# Patient Record
Sex: FEMALE | Race: WHITE | NOT HISPANIC OR LATINO | Employment: OTHER | URBAN - METROPOLITAN AREA
[De-identification: names, ages, dates, MRNs, and addresses within clinical notes are randomized per-mention and may not be internally consistent; named-entity substitution may affect disease eponyms.]

---

## 2024-02-14 ENCOUNTER — HOSPITAL ENCOUNTER (OUTPATIENT)
Age: 74
End: 2024-02-14
Attending: ORTHOPAEDIC SURGERY | Admitting: ORTHOPAEDIC SURGERY

## 2024-03-06 ENCOUNTER — HOSPITAL ENCOUNTER (EMERGENCY)
Facility: HOSPITAL | Age: 74
Discharge: LEFT BEFORE DISCHARGE | End: 2024-03-06
Payer: MEDICARE

## 2024-03-06 VITALS
HEART RATE: 97 BPM | TEMPERATURE: 97 F | SYSTOLIC BLOOD PRESSURE: 174 MMHG | HEIGHT: 60 IN | RESPIRATION RATE: 18 BRPM | OXYGEN SATURATION: 97 % | BODY MASS INDEX: 29.45 KG/M2 | WEIGHT: 150 LBS | DIASTOLIC BLOOD PRESSURE: 97 MMHG

## 2024-03-06 PROCEDURE — 93005 ELECTROCARDIOGRAM TRACING: CPT

## 2024-03-06 RX ORDER — MONTELUKAST SODIUM 4 MG/1
4 TABLET, CHEWABLE ORAL NIGHTLY
COMMUNITY

## 2024-03-06 RX ORDER — EZETIMIBE 10 MG/1
TABLET ORAL NIGHTLY
COMMUNITY

## 2024-03-06 RX ORDER — ZOLPIDEM TARTRATE 10 MG/1
10 TABLET ORAL NIGHTLY PRN
COMMUNITY

## 2024-03-06 RX ORDER — DULOXETIN HYDROCHLORIDE 30 MG/1
30 CAPSULE, DELAYED RELEASE ORAL DAILY
COMMUNITY

## 2024-03-06 RX ORDER — AMLODIPINE BESYLATE 5 MG/1
5 TABLET ORAL DAILY
COMMUNITY

## 2024-03-06 RX ORDER — PANTOPRAZOLE SODIUM 40 MG/1
40 TABLET, DELAYED RELEASE ORAL
COMMUNITY

## 2024-03-06 NOTE — ED INITIAL ASSESSMENT (HPI)
Pt to ED for worsening anxiety and insomnia for the last several days. States PCP recently prescribed duloxetine and zolpidem with no relief.

## 2024-03-07 LAB
ATRIAL RATE: 89 BPM
P AXIS: 58 DEGREES
P-R INTERVAL: 138 MS
Q-T INTERVAL: 344 MS
QRS DURATION: 78 MS
QTC CALCULATION (BEZET): 418 MS
R AXIS: 18 DEGREES
T AXIS: 39 DEGREES
VENTRICULAR RATE: 89 BPM

## 2024-05-13 ENCOUNTER — EXTERNAL LAB (OUTPATIENT)
Dept: HEALTH INFORMATION MANAGEMENT | Facility: OTHER | Age: 74
End: 2024-05-13

## 2024-05-13 LAB
ALBUMIN SERPL-MCNC: 4.2 G/DL (ref 3.5–5.7)
ALP SERPL-CCNC: 74 U/L (ref 34–104)
ALT SERPL-CCNC: 18 U/L (ref 7–52)
ANION GAP SERPL CALC-SCNC: 12.5 MEQ/L (ref 6.2–14.7)
APPEARANCE UR: CLEAR
APTT PPP: 33.4 SEC (ref 25–36)
AST SERPL-CCNC: 16 U/L (ref 13–39)
BACTERIA #/AREA URNS HPF: ABNORMAL /[HPF]
BASOPHILS # BLD: 0 10*3/UL (ref 0–0.2)
BASOPHILS NFR BLD: 0.3 % (ref 0–2)
BILIRUB SERPL-MCNC: 0.4 MG/DL (ref 0.3–1)
BILIRUB UR QL: NEGATIVE
BUN SERPL-MCNC: 17 MG/DL (ref 7–25)
CALCIUM SERPL-MCNC: 9.5 MG/DL (ref 8.6–10.3)
CHLORIDE SERPL-SCNC: 106 MEQ/L (ref 98–107)
CO2 SERPL-SCNC: 26.5 MEQ/L (ref 21–31)
COLOR UR: YELLOW
CREAT SERPL-MCNC: 0.6 MG/DL (ref 0.6–1.2)
EOSINOPHIL # BLD: 0.1 10*3/UL (ref 0–0.5)
EOSINOPHIL NFR BLD: 1.7 % (ref 0.1–6.8)
EPI CELLS #/AREA URNS LPF: ABNORMAL /[LPF]
ERYTHROCYTE [DISTWIDTH] IN BLOOD: 13.6 % (ref 11.9–15.9)
GFR SERPLBLD SCHWARTZ-ARVRAT: 95 ML/MIN/{1.73_M2}
GLUCOSE SERPL-MCNC: 89 MG/DL (ref 70–99)
GLUCOSE UR-MCNC: NEGATIVE MG/DL
HCT VFR BLD CALC: 41.1 % (ref 34.7–45.1)
HGB BLD-MCNC: 13.9 G/DL (ref 12–15.3)
HGB UR QL: NEGATIVE
INR PPP: 1
KETONES UR-MCNC: NEGATIVE MG/DL
LENGTH OF FAST TIME PATIENT: ABNORMAL H
LEUKOCYTE ESTERASE UR QL STRIP: ABNORMAL
LYMPHOCYTES # BLD: 2.1 10*3/UL (ref 0.6–3.4)
LYMPHOCYTES NFR BLD: 37.1 % (ref 14.5–45.2)
MCH RBC QN AUTO: 30.2 PG (ref 26–34)
MCHC RBC AUTO-ENTMCNC: 33.8 G/DL (ref 32.5–35.8)
MCV RBC AUTO: 89.5 FL (ref 80–100)
MONOCYTES # BLD: 0.6 10*3/UL (ref 0.3–1)
MONOCYTES NFR BLD: 10 % (ref 4.3–13.3)
MUCOUS THREADS URNS QL MICRO: ABNORMAL
NEUTROPHILS # BLD: 2.9 10*3/UL (ref 1.7–7.7)
NEUTROPHILS NFR BLD: 51 % (ref 43–82.3)
NITRITE UR QL: NEGATIVE
PH UR: 5.5 [PH] (ref 5–8)
PLATELET # BLD: 294 10*3/UL (ref 150–450)
PMV BLD AUTO: 8.1 FL (ref 6.8–10.2)
POTASSIUM SERPL-SCNC: 4.5 MEQ/L (ref 3.5–5.2)
PROT SERPL-MCNC: 7.2 G/DL (ref 6.4–8.9)
PROT UR QL: NEGATIVE MG/DL
PROTHROMBIN TIME: 11.3 SEC (ref 10.1–13.1)
RBC # BLD: 4.59 10*6/UL (ref 3.63–5.04)
RBC #/AREA URNS HPF: ABNORMAL /HPF
SODIUM SERPL-SCNC: 145 MEQ/L (ref 133–144)
SP GR UR: 1.02 (ref 1–1.03)
UROBILINOGEN UR QL: NEGATIVE MG/DL
WBC # BLD: 5.6 10*3/UL (ref 4–11)
WBC #/AREA URNS HPF: ABNORMAL /[HPF]

## 2024-05-28 ENCOUNTER — E-ADVICE (OUTPATIENT)
Dept: OTHER | Age: 74
End: 2024-05-28

## 2024-05-28 RX ORDER — EZETIMIBE 10 MG/1
10 TABLET ORAL DAILY
COMMUNITY
Start: 2024-01-19

## 2024-05-28 RX ORDER — PANTOPRAZOLE SODIUM 40 MG/1
40 TABLET, DELAYED RELEASE ORAL DAILY
COMMUNITY

## 2024-05-28 RX ORDER — MONTELUKAST SODIUM 4 MG/1
10 TABLET, CHEWABLE ORAL DAILY
COMMUNITY

## 2024-05-28 RX ORDER — AMLODIPINE BESYLATE 5 MG/1
5 TABLET ORAL DAILY
COMMUNITY

## 2024-05-28 RX ORDER — ZOLPIDEM TARTRATE 10 MG/1
10 TABLET ORAL NIGHTLY PRN
Status: ON HOLD | COMMUNITY
End: 2024-06-08 | Stop reason: HOSPADM

## 2024-05-28 RX ORDER — DULOXETIN HYDROCHLORIDE 30 MG/1
60 CAPSULE, DELAYED RELEASE ORAL DAILY
COMMUNITY

## 2024-05-28 RX ORDER — LATANOPROST 50 UG/ML
1 SOLUTION/ DROPS OPHTHALMIC NIGHTLY
COMMUNITY

## 2024-05-28 ASSESSMENT — HOOS JR
RISING FROM SITTING: MODERATE
WALKING ON UNEVEN SURFACE: MODERATE
LYING IN BED (TURNING OVER, MAINTAINING HIP POSITION): MODERATE
BENDING TO THE FLOOR TO PICK UP OBJECT: SEVERE
GOING UP OR DOWN STAIRS: MODERATE
SITTING: MODERATE
HOOS JR SCORING: 49.86

## 2024-05-28 ASSESSMENT — PROMIS GLOBAL HEALTH SCALE
CARRYOUT_SOCIAL_ACTIVITIES: GOOD
RATE_AVERAGE_FATIGUE: MILD
RATE_MENTAL_HEALTH: GOOD
PHYSICAL_HEALTH_TSCORE: INCOMPLETE
EMOTIONAL_PROBLEMS: SOMETIMES
MENTAL_HEALTH_TSCORE: INCOMPLETE
RATE_SOCIAL_SATISFACTION: VERY GOOD
SOCIAL_ACTIVITIES_ROLES_SCORE: 3
GENERAL_HEALTH_SCORE: 3
RATE_GENERAL_HEALTH: GOOD
RATE_AVERAGE_PAIN: 9
CARRYOUT_PHYSICAL_ACTIVITIES: A LITTLE
RATE_QUALITY_OF_LIFE: POOR
RATE_PHYSICAL_HEALTH: FAIR

## 2024-05-28 ASSESSMENT — ACTIVITIES OF DAILY LIVING (ADL)
ADL_SCORE: 12
HISTORY OF FALLING IN THE LAST YEAR (PRIOR TO ADMISSION): YES
ADL_BEFORE_ADMISSION: INDEPENDENT

## 2024-05-29 ENCOUNTER — TELEPHONE (OUTPATIENT)
Dept: ORTHOPEDICS | Age: 74
End: 2024-05-29

## 2024-06-07 ENCOUNTER — HOSPITAL ENCOUNTER (OUTPATIENT)
Age: 74
Discharge: HOME HEALTH | End: 2024-06-08
Attending: ORTHOPAEDIC SURGERY | Admitting: ORTHOPAEDIC SURGERY

## 2024-06-07 ENCOUNTER — ANESTHESIA (OUTPATIENT)
Dept: SURGERY | Age: 74
End: 2024-06-07

## 2024-06-07 ENCOUNTER — ANESTHESIA EVENT (OUTPATIENT)
Dept: SURGERY | Age: 74
End: 2024-06-07

## 2024-06-07 ENCOUNTER — APPOINTMENT (OUTPATIENT)
Dept: GENERAL RADIOLOGY | Age: 74
End: 2024-06-07
Attending: ORTHOPAEDIC SURGERY

## 2024-06-07 DIAGNOSIS — Z96.641 S/P TOTAL RIGHT HIP ARTHROPLASTY: Primary | ICD-10-CM

## 2024-06-07 LAB — GLUCOSE BLDC GLUCOMTR-MCNC: 86 MG/DL (ref 70–99)

## 2024-06-07 PROCEDURE — 10002801 HB RX 250 W/O HCPCS: Performed by: ORTHOPAEDIC SURGERY

## 2024-06-07 PROCEDURE — 10002807 HB RX 258: Performed by: ANESTHESIOLOGY

## 2024-06-07 PROCEDURE — 10006027 HB SUPPLY 278: Performed by: ORTHOPAEDIC SURGERY

## 2024-06-07 PROCEDURE — 10002800 HB RX 250 W HCPCS: Performed by: PHYSICIAN ASSISTANT

## 2024-06-07 PROCEDURE — 97161 PT EVAL LOW COMPLEX 20 MIN: CPT

## 2024-06-07 PROCEDURE — C1776 JOINT DEVICE (IMPLANTABLE): HCPCS | Performed by: ORTHOPAEDIC SURGERY

## 2024-06-07 PROCEDURE — 10002807 HB RX 258

## 2024-06-07 PROCEDURE — 10002803 HB RX 637: Performed by: NURSE PRACTITIONER

## 2024-06-07 PROCEDURE — 82962 GLUCOSE BLOOD TEST: CPT

## 2024-06-07 PROCEDURE — 13000118 HB ORTHO MAJOR COMPLEX CASE S/U + 1ST 15 MIN: Performed by: ORTHOPAEDIC SURGERY

## 2024-06-07 PROCEDURE — 10002807 HB RX 258: Performed by: NURSE ANESTHETIST, CERTIFIED REGISTERED

## 2024-06-07 PROCEDURE — 10002801 HB RX 250 W/O HCPCS: Performed by: ANESTHESIOLOGY

## 2024-06-07 PROCEDURE — 10002800 HB RX 250 W HCPCS: Performed by: NURSE ANESTHETIST, CERTIFIED REGISTERED

## 2024-06-07 PROCEDURE — 10005281 FL INTRAOPERATIVE C ARM WITH REPORT

## 2024-06-07 PROCEDURE — 97110 THERAPEUTIC EXERCISES: CPT

## 2024-06-07 PROCEDURE — 10006023 HB SUPPLY 272: Performed by: ORTHOPAEDIC SURGERY

## 2024-06-07 PROCEDURE — 13000119 HB ORTHO MAJOR COMPLEX CASE EA ADD MINUTE: Performed by: ORTHOPAEDIC SURGERY

## 2024-06-07 PROCEDURE — 97116 GAIT TRAINING THERAPY: CPT

## 2024-06-07 PROCEDURE — 10004452 HB PACU ADDL 30 MINUTES: Performed by: ORTHOPAEDIC SURGERY

## 2024-06-07 PROCEDURE — 13000009 HB ANESTHESIA SPINAL S/U + 1ST 15 MIN: Performed by: ORTHOPAEDIC SURGERY

## 2024-06-07 PROCEDURE — 10002801 HB RX 250 W/O HCPCS: Performed by: PHYSICIAN ASSISTANT

## 2024-06-07 PROCEDURE — 10004451 HB PACU RECOVERY 1ST 30 MINUTES: Performed by: ORTHOPAEDIC SURGERY

## 2024-06-07 PROCEDURE — 96374 THER/PROPH/DIAG INJ IV PUSH: CPT

## 2024-06-07 PROCEDURE — 10002801 HB RX 250 W/O HCPCS: Performed by: NURSE ANESTHETIST, CERTIFIED REGISTERED

## 2024-06-07 PROCEDURE — 10002803 HB RX 637: Performed by: PHYSICIAN ASSISTANT

## 2024-06-07 PROCEDURE — 13000010 HB ANESTHESIA SPINAL EA ADD MINUTE: Performed by: ORTHOPAEDIC SURGERY

## 2024-06-07 PROCEDURE — 10004651 HB RX, NO CHARGE ITEM: Performed by: PHYSICIAN ASSISTANT

## 2024-06-07 DEVICE — ACETABULAR SHELL Ø50 MULTI HOLES
Type: IMPLANTABLE DEVICE | Site: HIP | Status: FUNCTIONAL
Brand: MPACT ACETABULAR SYSTEM

## 2024-06-07 DEVICE — HEAD BIOLOX OPTION Ø 32
Type: IMPLANTABLE DEVICE | Site: HIP | Status: FUNCTIONAL
Brand: MECTACER BIOLOX OPTION HEADS

## 2024-06-07 DEVICE — SLEEVE SIZE M
Type: IMPLANTABLE DEVICE | Site: HIP | Status: FUNCTIONAL
Brand: MECTACER BIOLOX OPTION HEADS

## 2024-06-07 DEVICE — IMPLANTABLE DEVICE: Type: IMPLANTABLE DEVICE | Site: HIP | Status: FUNCTIONAL

## 2024-06-07 DEVICE — FLAT PE  HC LINER Ø 32 / D
Type: IMPLANTABLE DEVICE | Site: HIP | Status: FUNCTIONAL
Brand: MPACT ACETABULAR SYSTEM

## 2024-06-07 RX ORDER — LATANOPROST 50 UG/ML
1 SOLUTION/ DROPS OPHTHALMIC NIGHTLY
Status: DISCONTINUED | OUTPATIENT
Start: 2024-06-07 | End: 2024-06-08 | Stop reason: HOSPADM

## 2024-06-07 RX ORDER — ACETAMINOPHEN 500 MG
1000 TABLET ORAL
Status: DISCONTINUED | OUTPATIENT
Start: 2024-06-07 | End: 2024-06-07 | Stop reason: HOSPADM

## 2024-06-07 RX ORDER — AMOXICILLIN 250 MG
1 CAPSULE ORAL NIGHTLY
Status: DISCONTINUED | OUTPATIENT
Start: 2024-06-07 | End: 2024-06-08 | Stop reason: HOSPADM

## 2024-06-07 RX ORDER — ONDANSETRON 2 MG/ML
4 INJECTION INTRAMUSCULAR; INTRAVENOUS EVERY 6 HOURS PRN
Status: DISCONTINUED | OUTPATIENT
Start: 2024-06-07 | End: 2024-06-08 | Stop reason: HOSPADM

## 2024-06-07 RX ORDER — SODIUM CHLORIDE, SODIUM LACTATE, POTASSIUM CHLORIDE, CALCIUM CHLORIDE 600; 310; 30; 20 MG/100ML; MG/100ML; MG/100ML; MG/100ML
INJECTION, SOLUTION INTRAVENOUS CONTINUOUS PRN
Status: DISCONTINUED | OUTPATIENT
Start: 2024-06-07 | End: 2024-06-07

## 2024-06-07 RX ORDER — TRAMADOL HYDROCHLORIDE 50 MG/1
50 TABLET ORAL EVERY 4 HOURS PRN
Status: DISCONTINUED | OUTPATIENT
Start: 2024-06-07 | End: 2024-06-08 | Stop reason: HOSPADM

## 2024-06-07 RX ORDER — EZETIMIBE 10 MG/1
10 TABLET ORAL DAILY
Status: DISCONTINUED | OUTPATIENT
Start: 2024-06-08 | End: 2024-06-08 | Stop reason: HOSPADM

## 2024-06-07 RX ORDER — NICOTINE POLACRILEX 4 MG
30 LOZENGE BUCCAL
Status: ACTIVE | OUTPATIENT
Start: 2024-06-07 | End: 2024-06-07

## 2024-06-07 RX ORDER — DEXTROSE MONOHYDRATE 25 G/50ML
25 INJECTION, SOLUTION INTRAVENOUS PRN
Status: DISCONTINUED | OUTPATIENT
Start: 2024-06-07 | End: 2024-06-07 | Stop reason: HOSPADM

## 2024-06-07 RX ORDER — CELECOXIB 200 MG/1
200 CAPSULE ORAL
Status: DISCONTINUED | OUTPATIENT
Start: 2024-06-07 | End: 2024-06-07 | Stop reason: HOSPADM

## 2024-06-07 RX ORDER — 0.9 % SODIUM CHLORIDE 0.9 %
2 VIAL (ML) INJECTION EVERY 12 HOURS SCHEDULED
Status: DISCONTINUED | OUTPATIENT
Start: 2024-06-07 | End: 2024-06-07 | Stop reason: HOSPADM

## 2024-06-07 RX ORDER — ALBUTEROL SULFATE 2.5 MG/3ML
2.5 SOLUTION RESPIRATORY (INHALATION)
Status: DISCONTINUED | OUTPATIENT
Start: 2024-06-07 | End: 2024-06-07 | Stop reason: HOSPADM

## 2024-06-07 RX ORDER — MIDAZOLAM HYDROCHLORIDE 1 MG/ML
INJECTION, SOLUTION INTRAMUSCULAR; INTRAVENOUS PRN
Status: DISCONTINUED | OUTPATIENT
Start: 2024-06-07 | End: 2024-06-07

## 2024-06-07 RX ORDER — NALOXONE HCL 0.4 MG/ML
0.2 VIAL (ML) INJECTION EVERY 5 MIN PRN
Status: DISCONTINUED | OUTPATIENT
Start: 2024-06-07 | End: 2024-06-07 | Stop reason: HOSPADM

## 2024-06-07 RX ORDER — ASPIRIN 81 MG/1
81 TABLET ORAL EVERY 12 HOURS SCHEDULED
Status: DISCONTINUED | OUTPATIENT
Start: 2024-06-07 | End: 2024-06-08 | Stop reason: HOSPADM

## 2024-06-07 RX ORDER — NICOTINE POLACRILEX 4 MG
30 LOZENGE BUCCAL
Status: DISCONTINUED | OUTPATIENT
Start: 2024-06-07 | End: 2024-06-07 | Stop reason: HOSPADM

## 2024-06-07 RX ORDER — LIDOCAINE HYDROCHLORIDE 10 MG/ML
INJECTION, SOLUTION INFILTRATION; PERINEURAL PRN
Status: DISCONTINUED | OUTPATIENT
Start: 2024-06-07 | End: 2024-06-07

## 2024-06-07 RX ORDER — ZOLPIDEM TARTRATE 5 MG/1
5 TABLET ORAL NIGHTLY PRN
Status: DISCONTINUED | OUTPATIENT
Start: 2024-06-07 | End: 2024-06-08 | Stop reason: HOSPADM

## 2024-06-07 RX ORDER — DULOXETIN HYDROCHLORIDE 60 MG/1
60 CAPSULE, DELAYED RELEASE ORAL DAILY
Status: DISCONTINUED | OUTPATIENT
Start: 2024-06-08 | End: 2024-06-08 | Stop reason: HOSPADM

## 2024-06-07 RX ORDER — GLYCOPYRROLATE 0.2 MG/ML
INJECTION, SOLUTION INTRAMUSCULAR; INTRAVENOUS PRN
Status: DISCONTINUED | OUTPATIENT
Start: 2024-06-07 | End: 2024-06-07

## 2024-06-07 RX ORDER — SODIUM CHLORIDE 9 MG/ML
INJECTION, SOLUTION INTRAVENOUS CONTINUOUS
Status: DISCONTINUED | OUTPATIENT
Start: 2024-06-07 | End: 2024-06-07 | Stop reason: HOSPADM

## 2024-06-07 RX ORDER — EPHEDRINE SULFATE/0.9% NACL/PF 50 MG/10ML
SYRINGE (ML) INTRAVENOUS PRN
Status: DISCONTINUED | OUTPATIENT
Start: 2024-06-07 | End: 2024-06-07

## 2024-06-07 RX ORDER — TRANEXAMIC ACID 10 MG/ML
1000 INJECTION, SOLUTION INTRAVENOUS
Status: COMPLETED | OUTPATIENT
Start: 2024-06-07 | End: 2024-06-07

## 2024-06-07 RX ORDER — KETAMINE HYDROCHLORIDE 50 MG/ML
INJECTION, SOLUTION INTRAMUSCULAR; INTRAVENOUS PRN
Status: DISCONTINUED | OUTPATIENT
Start: 2024-06-07 | End: 2024-06-07

## 2024-06-07 RX ORDER — ACETAMINOPHEN 325 MG/1
650 TABLET ORAL EVERY 8 HOURS SCHEDULED
Status: DISCONTINUED | OUTPATIENT
Start: 2024-06-07 | End: 2024-06-08 | Stop reason: HOSPADM

## 2024-06-07 RX ORDER — SODIUM CHLORIDE, SODIUM LACTATE, POTASSIUM CHLORIDE, CALCIUM CHLORIDE 600; 310; 30; 20 MG/100ML; MG/100ML; MG/100ML; MG/100ML
INJECTION, SOLUTION INTRAVENOUS CONTINUOUS
Status: DISCONTINUED | OUTPATIENT
Start: 2024-06-07 | End: 2024-06-07 | Stop reason: HOSPADM

## 2024-06-07 RX ORDER — OXYCODONE HYDROCHLORIDE 5 MG/1
5 TABLET ORAL EVERY 6 HOURS PRN
Status: DISCONTINUED | OUTPATIENT
Start: 2024-06-07 | End: 2024-06-08 | Stop reason: HOSPADM

## 2024-06-07 RX ORDER — KETOROLAC TROMETHAMINE 15 MG/ML
15 INJECTION, SOLUTION INTRAMUSCULAR; INTRAVENOUS EVERY 6 HOURS PRN
Status: DISCONTINUED | OUTPATIENT
Start: 2024-06-07 | End: 2024-06-08 | Stop reason: HOSPADM

## 2024-06-07 RX ORDER — HYDRALAZINE HYDROCHLORIDE 20 MG/ML
5 INJECTION INTRAMUSCULAR; INTRAVENOUS EVERY 10 MIN PRN
Status: DISCONTINUED | OUTPATIENT
Start: 2024-06-07 | End: 2024-06-07 | Stop reason: HOSPADM

## 2024-06-07 RX ORDER — MONTELUKAST SODIUM 5 MG/1
10 TABLET, CHEWABLE ORAL DAILY
Status: DISCONTINUED | OUTPATIENT
Start: 2024-06-08 | End: 2024-06-08 | Stop reason: HOSPADM

## 2024-06-07 RX ORDER — SODIUM CHLORIDE 9 MG/ML
INJECTION, SOLUTION INTRAVENOUS CONTINUOUS
Status: DISCONTINUED | OUTPATIENT
Start: 2024-06-07 | End: 2024-06-08 | Stop reason: HOSPADM

## 2024-06-07 RX ORDER — PANTOPRAZOLE SODIUM 40 MG/1
40 TABLET, DELAYED RELEASE ORAL DAILY
Status: DISCONTINUED | OUTPATIENT
Start: 2024-06-07 | End: 2024-06-08 | Stop reason: HOSPADM

## 2024-06-07 RX ORDER — AMLODIPINE BESYLATE 5 MG/1
5 TABLET ORAL DAILY
Status: DISCONTINUED | OUTPATIENT
Start: 2024-06-08 | End: 2024-06-08 | Stop reason: HOSPADM

## 2024-06-07 RX ORDER — PHENYLEPHRINE HYDROCHLORIDE 10 MG/ML
INJECTION, SOLUTION INTRAMUSCULAR; INTRAVENOUS; SUBCUTANEOUS PRN
Status: DISCONTINUED | OUTPATIENT
Start: 2024-06-07 | End: 2024-06-07

## 2024-06-07 RX ORDER — PROPOFOL 10 MG/ML
INJECTION, EMULSION INTRAVENOUS PRN
Status: DISCONTINUED | OUTPATIENT
Start: 2024-06-07 | End: 2024-06-07

## 2024-06-07 RX ORDER — LIDOCAINE HYDROCHLORIDE 10 MG/ML
5 INJECTION, SOLUTION EPIDURAL; INFILTRATION; INTRACAUDAL; PERINEURAL PRN
Status: DISCONTINUED | OUTPATIENT
Start: 2024-06-07 | End: 2024-06-07 | Stop reason: HOSPADM

## 2024-06-07 RX ADMIN — SODIUM CHLORIDE, POTASSIUM CHLORIDE, SODIUM LACTATE AND CALCIUM CHLORIDE: 600; 310; 30; 20 INJECTION, SOLUTION INTRAVENOUS at 12:27

## 2024-06-07 RX ADMIN — CEFAZOLIN SODIUM 2000 MG: 300 INJECTION, POWDER, LYOPHILIZED, FOR SOLUTION INTRAVENOUS at 16:19

## 2024-06-07 RX ADMIN — PHENYLEPHRINE HYDROCHLORIDE 200 MCG: 10 INJECTION INTRAVENOUS at 13:58

## 2024-06-07 RX ADMIN — ACETAMINOPHEN 650 MG: 325 TABLET ORAL at 16:20

## 2024-06-07 RX ADMIN — PHENYLEPHRINE HYDROCHLORIDE 100 MCG: 10 INJECTION INTRAVENOUS at 14:22

## 2024-06-07 RX ADMIN — MIDAZOLAM HYDROCHLORIDE 1 MG: 1 INJECTION, SOLUTION INTRAMUSCULAR; INTRAVENOUS at 12:28

## 2024-06-07 RX ADMIN — EPHEDRINE SULFATE 15 MG: 5 INJECTION INTRAVENOUS at 14:16

## 2024-06-07 RX ADMIN — PANTOPRAZOLE SODIUM 40 MG: 40 TABLET, DELAYED RELEASE ORAL at 18:33

## 2024-06-07 RX ADMIN — TRANEXAMIC ACID 1000 MG: 10 INJECTION, SOLUTION INTRAVENOUS at 14:04

## 2024-06-07 RX ADMIN — FENTANYL CITRATE 25 MCG: 50 INJECTION INTRAMUSCULAR; INTRAVENOUS at 15:00

## 2024-06-07 RX ADMIN — ZOLPIDEM TARTRATE 5 MG: 5 TABLET, COATED ORAL at 21:15

## 2024-06-07 RX ADMIN — TRANEXAMIC ACID 1000 MG: 10 INJECTION, SOLUTION INTRAVENOUS at 12:43

## 2024-06-07 RX ADMIN — CELECOXIB 200 MG: 200 CAPSULE ORAL at 10:09

## 2024-06-07 RX ADMIN — PROPOFOL INJECTABLE EMULSION 100 MCG/KG/MIN: 10 INJECTION, EMULSION INTRAVENOUS at 12:47

## 2024-06-07 RX ADMIN — GLYCOPYRROLATE 0.2 MG: 0.2 INJECTION, SOLUTION INTRAMUSCULAR; INTRAVENOUS at 12:57

## 2024-06-07 RX ADMIN — ACETAMINOPHEN 1000 MG: 500 TABLET ORAL at 10:08

## 2024-06-07 RX ADMIN — SENNOSIDES AND DOCUSATE SODIUM 1 TABLET: 50; 8.6 TABLET ORAL at 21:15

## 2024-06-07 RX ADMIN — OXYCODONE HYDROCHLORIDE 5 MG: 5 TABLET ORAL at 21:15

## 2024-06-07 RX ADMIN — KETAMINE HYDROCHLORIDE 20 MG: 50 INJECTION INTRAMUSCULAR; INTRAVENOUS at 13:01

## 2024-06-07 RX ADMIN — EPHEDRINE SULFATE 20 MG: 5 INJECTION INTRAVENOUS at 14:20

## 2024-06-07 RX ADMIN — EPHEDRINE SULFATE 15 MG: 5 INJECTION INTRAVENOUS at 13:55

## 2024-06-07 RX ADMIN — LIDOCAINE HYDROCHLORIDE 5 ML: 10 INJECTION, SOLUTION INFILTRATION; PERINEURAL at 12:44

## 2024-06-07 RX ADMIN — ACETAMINOPHEN 650 MG: 325 TABLET ORAL at 21:15

## 2024-06-07 RX ADMIN — FENTANYL CITRATE 25 MCG: 50 INJECTION INTRAMUSCULAR; INTRAVENOUS at 15:10

## 2024-06-07 RX ADMIN — SODIUM CHLORIDE, POTASSIUM CHLORIDE, SODIUM LACTATE AND CALCIUM CHLORIDE: 600; 310; 30; 20 INJECTION, SOLUTION INTRAVENOUS at 12:26

## 2024-06-07 RX ADMIN — PROPOFOL 40 MG: 10 INJECTION, EMULSION INTRAVENOUS at 12:44

## 2024-06-07 RX ADMIN — CEFAZOLIN SODIUM 2000 MG: 300 INJECTION, POWDER, LYOPHILIZED, FOR SOLUTION INTRAVENOUS at 12:46

## 2024-06-07 RX ADMIN — FENTANYL CITRATE 25 MCG: 50 INJECTION INTRAMUSCULAR; INTRAVENOUS at 14:54

## 2024-06-07 RX ADMIN — SODIUM CHLORIDE: 9 INJECTION, SOLUTION INTRAVENOUS at 17:31

## 2024-06-07 RX ADMIN — MEPIVACAINE HYDROCHLORIDE 2 ML: 20 INJECTION, SOLUTION EPIDURAL; INFILTRATION at 12:38

## 2024-06-07 RX ADMIN — CEFAZOLIN SODIUM 2000 MG: 300 INJECTION, POWDER, LYOPHILIZED, FOR SOLUTION INTRAVENOUS at 21:15

## 2024-06-07 RX ADMIN — PROPOFOL 20 MG: 10 INJECTION, EMULSION INTRAVENOUS at 13:41

## 2024-06-07 RX ADMIN — ASPIRIN 81 MG: 81 TABLET, COATED ORAL at 21:15

## 2024-06-07 RX ADMIN — KETAMINE HYDROCHLORIDE 20 MG: 50 INJECTION INTRAMUSCULAR; INTRAVENOUS at 13:42

## 2024-06-07 SDOH — ECONOMIC STABILITY: TRANSPORTATION INSECURITY
IN THE PAST 12 MONTHS, HAS LACK OF RELIABLE TRANSPORTATION KEPT YOU FROM MEDICAL APPOINTMENTS, MEETINGS, WORK OR FROM GETTING THINGS NEEDED FOR DAILY LIVING?: NO

## 2024-06-07 SDOH — HEALTH STABILITY: GENERAL
BECAUSE OF A PHYSICAL, MENTAL, OR EMOTIONAL CONDITION, DO YOU HAVE SERIOUS DIFFICULTY CONCENTRATING, REMEMBERING OR MAKING DECISIONS?: NO

## 2024-06-07 SDOH — ECONOMIC STABILITY: FOOD INSECURITY: WITHIN THE PAST 12 MONTHS, THE FOOD YOU BOUGHT JUST DIDN'T LAST AND YOU DIDN'T HAVE MONEY TO GET MORE.: NEVER TRUE

## 2024-06-07 SDOH — ECONOMIC STABILITY: INCOME INSECURITY: IN THE PAST 12 MONTHS, HAS THE ELECTRIC, GAS, OIL, OR WATER COMPANY THREATENED TO SHUT OFF SERVICE IN YOUR HOME?: NO

## 2024-06-07 SDOH — SOCIAL STABILITY: SOCIAL INSECURITY: HOW OFTEN DOES ANYONE, INCLUDING FAMILY AND FRIENDS, SCREAM OR CURSE AT YOU?: NEVER

## 2024-06-07 SDOH — HEALTH STABILITY: GENERAL: BECAUSE OF A PHYSICAL, MENTAL, OR EMOTIONAL CONDITION, DO YOU HAVE DIFFICULTY DOING ERRANDS ALONE?: YES

## 2024-06-07 SDOH — SOCIAL STABILITY: SOCIAL INSECURITY: HOW OFTEN DOES ANYONE, INCLUDING FAMILY AND FRIENDS, THREATEN YOU WITH HARM?: NEVER

## 2024-06-07 SDOH — SOCIAL STABILITY: SOCIAL NETWORK
HOW OFTEN DO YOU SEE OR TALK TO PEOPLE THAT YOU CARE ABOUT AND FEEL CLOSE TO? (FOR EXAMPLE: TALKING TO FRIENDS ON THE PHONE, VISITING FRIENDS OR FAMILY, GOING TO CHURCH OR CLUB MEETINGS): 5 OR MORE TIMES A WEEK

## 2024-06-07 SDOH — ECONOMIC STABILITY: HOUSING INSECURITY: WHAT IS YOUR LIVING SITUATION TODAY?: CONDO

## 2024-06-07 SDOH — ECONOMIC STABILITY: HOUSING INSECURITY: WHAT IS YOUR LIVING SITUATION TODAY?: I HAVE A STEADY PLACE TO LIVE

## 2024-06-07 SDOH — ECONOMIC STABILITY: GENERAL: WOULD YOU LIKE HELP WITH ANY OF THE FOLLOWING NEEDS?: I DON'T WANT HELP WITH ANY OF THESE

## 2024-06-07 SDOH — ECONOMIC STABILITY: GENERAL

## 2024-06-07 SDOH — SOCIAL STABILITY: SOCIAL INSECURITY: RISK FACTORS: BMI> 30 (OBESITY)

## 2024-06-07 SDOH — HEALTH STABILITY: PHYSICAL HEALTH: DO YOU HAVE SERIOUS DIFFICULTY WALKING OR CLIMBING STAIRS?: NO

## 2024-06-07 SDOH — SOCIAL STABILITY: SOCIAL INSECURITY: HOW OFTEN DOES ANYONE, INCLUDING FAMILY AND FRIENDS, INSULT OR TALK DOWN TO YOU?: NEVER

## 2024-06-07 SDOH — ECONOMIC STABILITY: HOUSING INSECURITY: DO YOU HAVE PROBLEMS WITH ANY OF THE FOLLOWING?: NONE OF THE ABOVE

## 2024-06-07 SDOH — SOCIAL STABILITY: SOCIAL NETWORK: SUPPORT SYSTEMS: CHILDREN;SPOUSE

## 2024-06-07 SDOH — SOCIAL STABILITY: SOCIAL INSECURITY: HOW OFTEN DOES ANYONE, INCLUDING FAMILY AND FRIENDS, PHYSICALLY HURT YOU?: NEVER

## 2024-06-07 SDOH — ECONOMIC STABILITY: HOUSING INSECURITY: WHAT IS YOUR LIVING SITUATION TODAY?: SPOUSE

## 2024-06-07 SDOH — HEALTH STABILITY: PHYSICAL HEALTH: DO YOU HAVE DIFFICULTY DRESSING OR BATHING?: NO

## 2024-06-07 ASSESSMENT — PAIN SCALES - GENERAL
PAINLEVEL_OUTOF10: 0
PAINLEVEL_OUTOF10: 0
PAINLEVEL_OUTOF10: 4
PAINLEVEL_OUTOF10: 8
PAINLEVEL_OUTOF10: 10
PAINLEVEL_OUTOF10: 5
PAINLEVEL_OUTOF10: 3
PAINLEVEL_OUTOF10: 9

## 2024-06-07 ASSESSMENT — ENCOUNTER SYMPTOMS
SEIZURES: 0
CONSTIPATION: 0
ACTIVITY CHANGE: 1
ADENOPATHY: 0
VOMITING: 0
TREMORS: 0
DIARRHEA: 0
EYE REDNESS: 0
HEADACHES: 0
SORE THROAT: 0
FATIGUE: 0
ABDOMINAL PAIN: 0
COUGH: 0
TROUBLE SWALLOWING: 0
DIZZINESS: 0
WOUND: 0
CHILLS: 0
WHEEZING: 0
EYE PAIN: 0
DIAPHORESIS: 0
WEAKNESS: 0
FEVER: 0
ABDOMINAL DISTENTION: 0
RHINORRHEA: 0
PAIN SEVERITY NOW: 5
CHEST TIGHTNESS: 0
LIGHT-HEADEDNESS: 0
SHORTNESS OF BREATH: 0
NAUSEA: 0
BLOOD IN STOOL: 0

## 2024-06-07 ASSESSMENT — LIFESTYLE VARIABLES
ALCOHOL_USE_STATUS: NO OR LOW RISK WITH VALIDATED TOOL
HOW OFTEN DO YOU HAVE A DRINK CONTAINING ALCOHOL: NEVER
HOW MANY STANDARD DRINKS CONTAINING ALCOHOL DO YOU HAVE ON A TYPICAL DAY: 0,1 OR 2
AUDIT-C TOTAL SCORE: 0
HOW OFTEN DO YOU HAVE 6 OR MORE DRINKS ON ONE OCCASION: NEVER

## 2024-06-07 ASSESSMENT — ORIENTATION MEMORY CONCENTRATION TEST (OMCT)
SAY THE MONTHS IN REVERSE ORDER STARTING WITH LAST MONTH: CORRECT
WHAT TIME IS IT (NO WATCH OR CLOCK): CORRECT
WHAT YEAR IS IT NOW (MUST BE EXACT): CORRECT
COUNT BACKWARDS FROM 20 TO 1: CORRECT
OMCT SCORE: 0
WHAT MONTH IS IT NOW: CORRECT
REPEAT THE NAME AND ADDRESS I ASKED YOU TO REMEMBER: CORRECT
OMCT INTERPRETATION: 0-6: NO SIGNIFICANT IMPAIRMENT

## 2024-06-07 ASSESSMENT — COLUMBIA-SUICIDE SEVERITY RATING SCALE - C-SSRS
6. HAVE YOU EVER DONE ANYTHING, STARTED TO DO ANYTHING, OR PREPARED TO DO ANYTHING TO END YOUR LIFE?: NO
IS THE PATIENT ABLE TO COMPLETE C-SSRS: YES
1. WITHIN THE PAST MONTH, HAVE YOU WISHED YOU WERE DEAD OR WISHED YOU COULD GO TO SLEEP AND NOT WAKE UP?: NO
2. HAVE YOU ACTUALLY HAD ANY THOUGHTS OF KILLING YOURSELF?: NO

## 2024-06-07 ASSESSMENT — PATIENT HEALTH QUESTIONNAIRE - PHQ9
CLINICAL INTERPRETATION OF PHQ2 SCORE: NO FURTHER SCREENING NEEDED
IS PATIENT ABLE TO COMPLETE PHQ2 OR PHQ9: YES
1. LITTLE INTEREST OR PLEASURE IN DOING THINGS: SEVERAL DAYS
SUM OF ALL RESPONSES TO PHQ9 QUESTIONS 1 AND 2: 2
2. FEELING DOWN, DEPRESSED OR HOPELESS: SEVERAL DAYS
SUM OF ALL RESPONSES TO PHQ9 QUESTIONS 1 AND 2: 2

## 2024-06-07 ASSESSMENT — ACTIVITIES OF DAILY LIVING (ADL)
ADL_BEFORE_ADMISSION: INDEPENDENT
RECENT_DECLINE_ADL: NO
ADL_SHORT_OF_BREATH: NO
ADL_SCORE: 12

## 2024-06-07 ASSESSMENT — PAIN SCALES - WONG BAKER
WONGBAKER_NUMERICALRESPONSE: 0
WONGBAKER_NUMERICALRESPONSE: 10

## 2024-06-07 ASSESSMENT — COGNITIVE AND FUNCTIONAL STATUS - GENERAL
BASIC_MOBILITY_CONVERTED_SCORE: 38.32
BASIC_MOBILITY_RAW_SCORE: 16

## 2024-06-08 ENCOUNTER — APPOINTMENT (OUTPATIENT)
Dept: ULTRASOUND IMAGING | Age: 74
End: 2024-06-08
Attending: PHYSICIAN ASSISTANT

## 2024-06-08 VITALS
RESPIRATION RATE: 16 BRPM | OXYGEN SATURATION: 97 % | SYSTOLIC BLOOD PRESSURE: 126 MMHG | HEIGHT: 57 IN | DIASTOLIC BLOOD PRESSURE: 69 MMHG | HEART RATE: 75 BPM | BODY MASS INDEX: 32.53 KG/M2 | WEIGHT: 150.79 LBS | TEMPERATURE: 99.3 F

## 2024-06-08 LAB
ALBUMIN SERPL-MCNC: 3 G/DL (ref 3.6–5.1)
ALBUMIN/GLOB SERPL: 1 {RATIO} (ref 1–2.4)
ALP SERPL-CCNC: 66 UNITS/L (ref 45–117)
ALT SERPL-CCNC: 23 UNITS/L
ANION GAP SERPL CALC-SCNC: 5 MMOL/L (ref 7–19)
AST SERPL-CCNC: 27 UNITS/L
BASOPHILS # BLD: 0 K/MCL (ref 0–0.3)
BASOPHILS NFR BLD: 0 %
BILIRUB SERPL-MCNC: 0.2 MG/DL (ref 0.2–1)
BUN SERPL-MCNC: 9 MG/DL (ref 6–20)
BUN/CREAT SERPL: 16 (ref 7–25)
CALCIUM SERPL-MCNC: 8.5 MG/DL (ref 8.4–10.2)
CHLORIDE SERPL-SCNC: 108 MMOL/L (ref 97–110)
CO2 SERPL-SCNC: 30 MMOL/L (ref 21–32)
CREAT SERPL-MCNC: 0.57 MG/DL (ref 0.51–0.95)
DEPRECATED RDW RBC: 44.3 FL (ref 39–50)
EGFRCR SERPLBLD CKD-EPI 2021: >90 ML/MIN/{1.73_M2}
EOSINOPHIL # BLD: 0 K/MCL (ref 0–0.5)
EOSINOPHIL NFR BLD: 1 %
ERYTHROCYTE [DISTWIDTH] IN BLOOD: 13.5 % (ref 11–15)
FASTING DURATION TIME PATIENT: ABNORMAL H
GLOBULIN SER-MCNC: 3.1 G/DL (ref 2–4)
GLUCOSE SERPL-MCNC: 117 MG/DL (ref 70–99)
HCT VFR BLD CALC: 35 % (ref 36–46.5)
HGB BLD-MCNC: 11.4 G/DL (ref 12–15.5)
IMM GRANULOCYTES # BLD AUTO: 0 K/MCL (ref 0–0.2)
IMM GRANULOCYTES # BLD: 0 %
LYMPHOCYTES # BLD: 1.7 K/MCL (ref 1–4)
LYMPHOCYTES NFR BLD: 21 %
MAGNESIUM SERPL-MCNC: 1.8 MG/DL (ref 1.7–2.4)
MCH RBC QN AUTO: 29.5 PG (ref 26–34)
MCHC RBC AUTO-ENTMCNC: 32.6 G/DL (ref 32–36.5)
MCV RBC AUTO: 90.7 FL (ref 78–100)
MONOCYTES # BLD: 0.7 K/MCL (ref 0.3–0.9)
MONOCYTES NFR BLD: 9 %
NEUTROPHILS # BLD: 5.8 K/MCL (ref 1.8–7.7)
NEUTROPHILS NFR BLD: 69 %
NRBC BLD MANUAL-RTO: 0 /100 WBC
PLATELET # BLD AUTO: 246 K/MCL (ref 140–450)
POTASSIUM SERPL-SCNC: 3.9 MMOL/L (ref 3.4–5.1)
PROT SERPL-MCNC: 6.1 G/DL (ref 6.4–8.2)
RBC # BLD: 3.86 MIL/MCL (ref 4–5.2)
SODIUM SERPL-SCNC: 139 MMOL/L (ref 135–145)
WBC # BLD: 8.3 K/MCL (ref 4.2–11)

## 2024-06-08 PROCEDURE — 10002803 HB RX 637: Performed by: PHYSICIAN ASSISTANT

## 2024-06-08 PROCEDURE — 83735 ASSAY OF MAGNESIUM: CPT | Performed by: NURSE PRACTITIONER

## 2024-06-08 PROCEDURE — 80053 COMPREHEN METABOLIC PANEL: CPT | Performed by: NURSE PRACTITIONER

## 2024-06-08 PROCEDURE — 97116 GAIT TRAINING THERAPY: CPT

## 2024-06-08 PROCEDURE — 36415 COLL VENOUS BLD VENIPUNCTURE: CPT | Performed by: NURSE PRACTITIONER

## 2024-06-08 PROCEDURE — 10002803 HB RX 637: Performed by: NURSE PRACTITIONER

## 2024-06-08 PROCEDURE — 10004651 HB RX, NO CHARGE ITEM: Performed by: PHYSICIAN ASSISTANT

## 2024-06-08 PROCEDURE — 85025 COMPLETE CBC W/AUTO DIFF WBC: CPT | Performed by: NURSE PRACTITIONER

## 2024-06-08 PROCEDURE — 97530 THERAPEUTIC ACTIVITIES: CPT

## 2024-06-08 PROCEDURE — 93971 EXTREMITY STUDY: CPT

## 2024-06-08 RX ADMIN — ASPIRIN 81 MG: 81 TABLET, COATED ORAL at 08:45

## 2024-06-08 RX ADMIN — DULOXETINE HYDROCHLORIDE 60 MG: 60 CAPSULE, DELAYED RELEASE ORAL at 08:45

## 2024-06-08 RX ADMIN — OXYCODONE HYDROCHLORIDE 5 MG: 5 TABLET ORAL at 10:24

## 2024-06-08 RX ADMIN — ACETAMINOPHEN 650 MG: 325 TABLET ORAL at 14:02

## 2024-06-08 RX ADMIN — TRAMADOL HYDROCHLORIDE 50 MG: 50 TABLET, COATED ORAL at 05:42

## 2024-06-08 RX ADMIN — EZETIMIBE 10 MG: 10 TABLET ORAL at 08:45

## 2024-06-08 RX ADMIN — PANTOPRAZOLE SODIUM 40 MG: 40 TABLET, DELAYED RELEASE ORAL at 08:45

## 2024-06-08 RX ADMIN — MONTELUKAST SODIUM 10 MG: 5 TABLET, CHEWABLE ORAL at 08:45

## 2024-06-08 RX ADMIN — AMLODIPINE BESYLATE 5 MG: 5 TABLET ORAL at 08:45

## 2024-06-08 RX ADMIN — ACETAMINOPHEN 650 MG: 325 TABLET ORAL at 05:43

## 2024-06-08 RX ADMIN — LATANOPROST 1 DROP: 50 SOLUTION/ DROPS OPHTHALMIC at 00:13

## 2024-06-08 ASSESSMENT — PAIN SCALES - GENERAL
PAINLEVEL_OUTOF10: 3
PAINLEVEL_OUTOF10: 2
PAINLEVEL_OUTOF10: 0
PAINLEVEL_OUTOF10: 2
PAINLEVEL_OUTOF10: 5

## 2024-06-08 ASSESSMENT — COGNITIVE AND FUNCTIONAL STATUS - GENERAL
BASIC_MOBILITY_CONVERTED_SCORE: 38.32
BASIC_MOBILITY_RAW_SCORE: 16

## 2024-06-08 ASSESSMENT — ENCOUNTER SYMPTOMS
PAIN SEVERITY NOW: 2
PAIN SEVERITY NOW: 4

## 2025-03-25 ENCOUNTER — APPOINTMENT (OUTPATIENT)
Dept: CT IMAGING | Facility: HOSPITAL | Age: 75
End: 2025-03-25
Attending: EMERGENCY MEDICINE
Payer: MEDICARE

## 2025-03-25 ENCOUNTER — HOSPITAL ENCOUNTER (OUTPATIENT)
Age: 75
Discharge: ANOTHER HOSPITAL | End: 2025-03-25
Payer: MEDICARE

## 2025-03-25 ENCOUNTER — HOSPITAL ENCOUNTER (EMERGENCY)
Facility: HOSPITAL | Age: 75
Discharge: HOME | End: 2025-03-25
Attending: EMERGENCY MEDICINE
Payer: MEDICARE

## 2025-03-25 VITALS
DIASTOLIC BLOOD PRESSURE: 80 MMHG | RESPIRATION RATE: 18 BRPM | SYSTOLIC BLOOD PRESSURE: 130 MMHG | OXYGEN SATURATION: 97 % | TEMPERATURE: 99 F | BODY MASS INDEX: 29.84 KG/M2 | HEART RATE: 62 BPM | HEIGHT: 60 IN | WEIGHT: 152 LBS

## 2025-03-25 VITALS
RESPIRATION RATE: 18 BRPM | SYSTOLIC BLOOD PRESSURE: 160 MMHG | OXYGEN SATURATION: 98 % | HEART RATE: 65 BPM | DIASTOLIC BLOOD PRESSURE: 113 MMHG | TEMPERATURE: 98 F

## 2025-03-25 DIAGNOSIS — R10.13 EPIGASTRIC PAIN: Primary | ICD-10-CM

## 2025-03-25 DIAGNOSIS — R07.9 CHEST PAIN OF UNCERTAIN ETIOLOGY: Primary | ICD-10-CM

## 2025-03-25 LAB
ALBUMIN SERPL-MCNC: 4.3 G/DL (ref 3.2–4.8)
ALP LIVER SERPL-CCNC: 68 U/L
ALT SERPL-CCNC: 14 U/L
ANION GAP SERPL CALC-SCNC: 9 MMOL/L (ref 0–18)
AST SERPL-CCNC: 15 U/L (ref ?–34)
BASOPHILS # BLD AUTO: 0.02 X10(3) UL (ref 0–0.2)
BASOPHILS NFR BLD AUTO: 0.3 %
BILIRUB DIRECT SERPL-MCNC: <0.1 MG/DL (ref ?–0.3)
BILIRUB SERPL-MCNC: 0.3 MG/DL (ref 0.2–1.1)
BUN BLD-MCNC: 12 MG/DL (ref 9–23)
CALCIUM BLD-MCNC: 8.5 MG/DL (ref 8.7–10.4)
CHLORIDE SERPL-SCNC: 107 MMOL/L (ref 98–112)
CO2 SERPL-SCNC: 25 MMOL/L (ref 21–32)
CREAT BLD-MCNC: 0.66 MG/DL
DEPRECATED RDW RBC AUTO: 43.6 FL (ref 35.1–46.3)
EGFRCR SERPLBLD CKD-EPI 2021: 93 ML/MIN/1.73M2 (ref 60–?)
EOSINOPHIL # BLD AUTO: 0.06 X10(3) UL (ref 0–0.7)
EOSINOPHIL NFR BLD AUTO: 0.9 %
ERYTHROCYTE [DISTWIDTH] IN BLOOD BY AUTOMATED COUNT: 13.2 % (ref 11–15)
GLUCOSE BLD-MCNC: 114 MG/DL (ref 70–99)
HCT VFR BLD AUTO: 41.6 %
HGB BLD-MCNC: 14.3 G/DL
IMM GRANULOCYTES # BLD AUTO: 0.02 X10(3) UL (ref 0–1)
IMM GRANULOCYTES NFR BLD: 0.3 %
LIPASE SERPL-CCNC: 33 U/L (ref 12–53)
LYMPHOCYTES # BLD AUTO: 2.05 X10(3) UL (ref 1–4)
LYMPHOCYTES NFR BLD AUTO: 32.3 %
MCH RBC QN AUTO: 30.6 PG (ref 26–34)
MCHC RBC AUTO-ENTMCNC: 34.4 G/DL (ref 31–37)
MCV RBC AUTO: 89.1 FL
MONOCYTES # BLD AUTO: 0.42 X10(3) UL (ref 0.1–1)
MONOCYTES NFR BLD AUTO: 6.6 %
NEUTROPHILS # BLD AUTO: 3.77 X10 (3) UL (ref 1.5–7.7)
NEUTROPHILS # BLD AUTO: 3.77 X10(3) UL (ref 1.5–7.7)
NEUTROPHILS NFR BLD AUTO: 59.6 %
PLATELET # BLD AUTO: 282 10(3)UL (ref 150–450)
POTASSIUM SERPL-SCNC: 3.4 MMOL/L (ref 3.5–5.1)
PROT SERPL-MCNC: 7.2 G/DL (ref 5.7–8.2)
RBC # BLD AUTO: 4.67 X10(6)UL
SODIUM SERPL-SCNC: 141 MMOL/L (ref 136–145)
TROPONIN I SERPL HS-MCNC: 5 NG/L
TROPONIN I SERPL HS-MCNC: 8 NG/L
WBC # BLD AUTO: 6.3 X10(3) UL (ref 4–11)

## 2025-03-25 PROCEDURE — 99285 EMERGENCY DEPT VISIT HI MDM: CPT

## 2025-03-25 PROCEDURE — 85025 COMPLETE CBC W/AUTO DIFF WBC: CPT | Performed by: EMERGENCY MEDICINE

## 2025-03-25 PROCEDURE — 84484 ASSAY OF TROPONIN QUANT: CPT | Performed by: EMERGENCY MEDICINE

## 2025-03-25 PROCEDURE — 93005 ELECTROCARDIOGRAM TRACING: CPT

## 2025-03-25 PROCEDURE — 96375 TX/PRO/DX INJ NEW DRUG ADDON: CPT

## 2025-03-25 PROCEDURE — 93010 ELECTROCARDIOGRAM REPORT: CPT

## 2025-03-25 PROCEDURE — 71260 CT THORAX DX C+: CPT | Performed by: EMERGENCY MEDICINE

## 2025-03-25 PROCEDURE — 99214 OFFICE O/P EST MOD 30 MIN: CPT

## 2025-03-25 PROCEDURE — 96374 THER/PROPH/DIAG INJ IV PUSH: CPT

## 2025-03-25 PROCEDURE — 80048 BASIC METABOLIC PNL TOTAL CA: CPT | Performed by: EMERGENCY MEDICINE

## 2025-03-25 PROCEDURE — 83690 ASSAY OF LIPASE: CPT | Performed by: EMERGENCY MEDICINE

## 2025-03-25 PROCEDURE — 80076 HEPATIC FUNCTION PANEL: CPT | Performed by: EMERGENCY MEDICINE

## 2025-03-25 RX ORDER — ONDANSETRON 2 MG/ML
4 INJECTION INTRAMUSCULAR; INTRAVENOUS ONCE
Status: COMPLETED | OUTPATIENT
Start: 2025-03-25 | End: 2025-03-25

## 2025-03-25 RX ORDER — LORAZEPAM 2 MG/ML
1 INJECTION INTRAMUSCULAR ONCE
Status: COMPLETED | OUTPATIENT
Start: 2025-03-25 | End: 2025-03-25

## 2025-03-25 NOTE — ED PROVIDER NOTES
Patient Seen in: [facility]      History     Chief Complaint   Patient presents with    Chest Pain Angina     Stated Complaint: chest pain    Subjective:   HPI    This is a 74-year-old female with a history of hypertension breast cancer diarrhea insomnia acid reflux presenting with epigastric chest pain.  Patient states prior to arrival she started having epigastric pain and chest pain and pain radiating down her left arm.  Patient states the only thing that is new is that she started an antibiotic for her tooth called clindamycin.  Denies any injury or trauma.  Denies shortness of breath nausea vomiting or diarrhea      Objective:     No pertinent past medical history.            No pertinent past surgical history.              No pertinent social history.            Review of Systems    Positive for stated complaint: chest pain  Other systems are as noted in HPI.  Constitutional and vital signs reviewed.      All other systems reviewed and negative except as noted above.    Physical Exam     ED Triage Vitals   BP 03/25/25 1558 (!) 160/113   Pulse 03/25/25 1558 65   Resp 03/25/25 1558 18   Temp 03/25/25 1559 97.9 °F (36.6 °C)   Temp src 03/25/25 1559 Oral   SpO2 03/25/25 1558 98 %   O2 Device 03/25/25 1558 None (Room air)       Current Vitals:   Vital Signs  BP: (!) 160/113  Pulse: 65  Resp: 18  Temp: 97.9 °F (36.6 °C)  Temp src: Oral    Oxygen Therapy  SpO2: 98 %  O2 Device: None (Room air)        Physical Exam  Vitals and nursing note reviewed.   Constitutional:       Appearance: Normal appearance.   HENT:      Right Ear: External ear normal.      Left Ear: External ear normal.      Nose: Nose normal.      Mouth/Throat:      Mouth: Mucous membranes are moist.      Pharynx: Oropharynx is clear.   Eyes:      Conjunctiva/sclera: Conjunctivae normal.   Cardiovascular:      Rate and Rhythm: Normal rate.   Pulmonary:      Effort: Pulmonary effort is normal. No respiratory distress.      Breath sounds:  Normal breath sounds. No wheezing.   Abdominal:      Palpations: Abdomen is soft.      Tenderness: There is abdominal tenderness in the epigastric area.   Musculoskeletal:         General: Normal range of motion.      Cervical back: Normal range of motion.   Skin:     General: Skin is warm.      Capillary Refill: Capillary refill takes less than 2 seconds.   Neurological:      General: No focal deficit present.      Mental Status: She is alert and oriented to person, place, and time.         ED Course   Labs Reviewed - No data to display  EKG    Rate, intervals and axes as noted on EKG Report.  Rate: 67  Rhythm: Sinus Rhythm  Reading: Normal sinus rhythm no STEMI  Reviewed and discussed with my attending Dr. Martinez POOL           Medical Decision Making  74-year-old female presenting with midsternal chest pain and pain radiating down the left arm DDx ACS versus gastritis versus GERD versus pancreatitis versus aortic dissection versus PE versus anxiety.  EKG was obtained discussed with the patient possible diagnoses and transfer to the ER discussed transfer via EMS and discussed risk factors of not going to the ER such as worsening symptoms debilitating disease or death.  Patient states she understands the risk factors and will go to [facility] emergency department.    Amount and/or Complexity of Data Reviewed  ECG/medicine tests: ordered and independent interpretation performed. Decision-making details documented in ED Course.  Discussion of management or test interpretation with external provider(s): Patient was discussed with my attending Dr. Henriquez who agrees with this provider's management and plan of care.        Disposition and Plan     Clinical Impression:  1. Chest pain of uncertain etiology         Disposition:  Ic to ed  3/25/2025  3:58 pm    Follow-up:  No follow-up provider specified.        Medications Prescribed:  Current Discharge Medication List              Supplementary  Documentation:

## 2025-03-25 NOTE — ED INITIAL ASSESSMENT (HPI)
Patient with epigastric pain, left arm and left jaw pain shortly after taking a dose of clindamycin this afternoon.  Patient denies shortness of breath.  Provider at bedside ems called.

## 2025-03-25 NOTE — ED PROVIDER NOTES
Patient Seen in: [facility] Emergency Department      History     Chief Complaint   Patient presents with    Epigastric Pain     Stated Complaint: epigastric pain    Subjective:   HPI  74-year-old female with prior history of breast cancer, hypertension, anxiety, presents for evaluation of epigastric pain.  She was recently put on an antibiotic for tooth infection yesterday.  She has taken 2 doses of it and every time shortly after she takes it, she develops epigastric pain.  Today her epigastric pain started about 2 PM.  It radiates towards the left arm and towards the right side of her back.  No right upper quadrant pain.  No nausea or vomiting.  She has looser stools.  No fever or chills.  No abdominal surgeries.        Objective:     Past Medical History:    Anxiety    Cancer (HCC)    Essential hypertension              Past Surgical History:   Procedure Laterality Date    Breast surgery                  Social History     Socioeconomic History    Marital status:    Tobacco Use    Smoking status: Never    Smokeless tobacco: Never   Vaping Use    Vaping status: Never Used   Substance and Sexual Activity    Alcohol use: Never    Drug use: Never     Social Drivers of Health     Food Insecurity: Low Risk  (6/7/2024)    Received from [facility]    Food Insecurity     Within the past 12 months, you worried that your food would run out before you got money to buy more.  : Never true     Within the past 12 months, the food you bought just didn't last and you didn't have money to get more. : Never true   Transportation Needs: Not At Risk (6/7/2024)    Received from [facility]    Transportation Needs     In the past 12 months, has lack of reliable transportation kept you from medical appointments, meetings, work or from getting things needed for daily living? : No                  Physical Exam     ED Triage Vitals   BP 03/25/25 1644 (!) 161/76   Pulse 03/25/25 1634 73   Resp  03/25/25 1634 23   Temp 03/25/25 1634 98.5 °F (36.9 °C)   Temp src 03/25/25 1634 Temporal   SpO2 03/25/25 1634 94 %   O2 Device 03/25/25 1634 None (Room air)       Current Vitals:   Vital Signs  BP: 123/83  Pulse: 58  Resp: 17  Temp: 98.5 °F (36.9 °C)  Temp src: Temporal  MAP (mmHg): 95    Oxygen Therapy  SpO2: 94 %  O2 Device: None (Room air)        Physical Exam  Vitals and nursing note reviewed.   Constitutional:       Appearance: She is well-developed.   HENT:      Head: Normocephalic and atraumatic.   Eyes:      Extraocular Movements: Extraocular movements intact.   Cardiovascular:      Rate and Rhythm: Normal rate and regular rhythm.      Heart sounds: Normal heart sounds.      Comments: Radial pulses 2+ bl  Pulmonary:      Effort: Pulmonary effort is normal.      Breath sounds: Normal breath sounds.   Abdominal:      General: There is no distension.      Palpations: Abdomen is soft.      Tenderness: There is abdominal tenderness.      Comments: Epigastric pain without rebound or guarding   Musculoskeletal:         General: Normal range of motion.      Cervical back: Normal range of motion.      Right lower leg: No edema.      Left lower leg: No edema.   Skin:     General: Skin is warm.   Neurological:      Mental Status: She is alert.      Comments: No focal deficits       Differential diagnosis includes but is not limited to gastritis, GERD, PUD, pancreatitis, cholelithiasis, cholecystitis, aortic dissection, ACS/MI    ED Course     Labs Reviewed   BASIC METABOLIC PANEL (8) - Abnormal; Notable for the following components:       Result Value    Glucose 114 (*)     Calcium, Total 8.5 (*)     All other components within normal limits   REDRAW CMP (P) - Abnormal; Notable for the following components:    Potassium 3.4 (*)     All other components within normal limits   LIPASE - Normal   TROPONIN I HIGH SENSITIVITY - Normal   TROPONIN I HIGH SENSITIVITY - Normal   CBC WITH DIFFERENTIAL WITH PLATELET   HEPATIC  FUNCTION PANEL (7)   RAINBOW DRAW LAVENDER   RAINBOW DRAW LIGHT GREEN   RAINBOW DRAW BLUE     EKG    Rate, intervals and axes as noted on EKG Report.  Rate: 73  Rhythm: Sinus Rhythm  Reading: No STEMI, no ectopy                CT CHEST PE AORTA (IV ONLY) (CPT=71260)    Result Date: 3/25/2025  CONCLUSION:  1. No evidence of acute pulmonary embolism.  No evidence of thoracic aortic aneurysm or dissection. 2. No evidence of pneumonia. 3. Marked differential attenuation of the lung parenchyma suggests air trapping or mosaic perfusion. 4. Elevation of the right hemidiaphragm with discoid atelectasis or scarring in the right middle and right lower lobes. 5. Peripherally enhancing 1.2 cm right thyroid nodule. 6. Cholecystectomy. 7. Lesser incidental findings as above.   elm-remote  Dictated by (CST): George Spicer MD on 3/25/2025 at 6:32 PM     Finalized by (CST): George Spicer MD on 3/25/2025 at 6:38 PM                MDM            Medical Decision Making  Patient is well-appearing, with reproducible pain in the epigastrium.  No neurologic deficit.  I ordered and interpreted labs including BMP, C BC, LFTs, lipase and troponin which are unremarkable.  Serial troponin 2 hours remains within normal limits.  She did require IV Ativan to tolerate CT due to severe claustrophobia.    Advise continuing her antibiotic with food in the stomach, continuing her PPI and outpatient follow-up with her GI specialist and she is comfortable with this plan.  Per my independent interpretation of CT chest, no PE or dissection.  She feels much better after Zofran, Protonix and GI cocktail.  Her epigastric pain is resolved.  Discussed continuing her antibiotic with food in the stomach, continuation of PPI, and outpatient follow-up with her GI specialist.    Problems Addressed:  Epigastric pain: complicated acute illness or injury with systemic symptoms    Amount and/or Complexity of Data Reviewed  External Data Reviewed: notes.      Details:   Reviewed immediate care note from earlier today which prompted this ER visit  Labs: ordered. Decision-making details documented in ED Course.  Radiology: ordered and independent interpretation performed. Decision-making details documented in ED Course.  ECG/medicine tests: ordered and independent interpretation performed. Decision-making details documented in ED Course.    Risk  Parenteral controlled substances.  Decision regarding hospitalization.      Disposition and Plan     Clinical Impression:  1. Epigastric pain         Disposition:  Discharge  3/25/2025  8:06 pm    Follow-up:  Steven Arreola  [address]  [address]  [facility] [address]  [phone]    Follow up      Steven Arreola  [address] [facility] [address]  [phone]          We recommend that you schedule follow up care with a primary care provider within the next three months to obtain basic health screening including reassessment of your blood pressure.      Medications Prescribed:  Current Discharge Medication List              Supplementary Documentation:

## 2025-03-25 NOTE — ED INITIAL ASSESSMENT (HPI)
Pt arrives with intermittent epigastric pain radiating to her left arm. Pt denies vomiting. +diarrhea.

## 2025-03-26 LAB
ATRIAL RATE: 73 BPM
P AXIS: 61 DEGREES
P-R INTERVAL: 142 MS
Q-T INTERVAL: 388 MS
QRS DURATION: 78 MS
QTC CALCULATION (BEZET): 427 MS
R AXIS: 23 DEGREES
T AXIS: 33 DEGREES
VENTRICULAR RATE: 73 BPM

## 2025-03-26 NOTE — DISCHARGE INSTRUCTIONS
Return to the emergency department if you develop severe and persistent chest pain, difficulty breathing, dizziness, leg swelling, or if you are coughing up blood, as these can be signs of a medical emergency.  Please call your doctor for a follow-up appointment in 1 to 3 days to determine the need for further testing.    Return to the emergency department if you develop severe abdominal pain, severe nausea and vomiting to the point where you are unable to keep down fluids, if you develop chest pain or difficulty breathing, blood in your stool, dizziness or fainting, or if you develop any other new or concerning symptoms as these could be signs of more serious medical illness.  Try to stay well-hydrated.    Be sure to take your antibiotic with food.

## (undated) DEVICE — GLOVE SURG 6.5 PROTEXIS LF BLUE PF SMTH BEAD CUFF INTLK STRL

## (undated) DEVICE — SUTURE VICRYL 1 CT1 27IN BRAID COAT ABS VIOL J341H

## (undated) DEVICE — SEALER ESURG .226IN .137IN AQUAMANTYS 6 30D .236IN SPACE BP

## (undated) DEVICE — SOLUTION IRR 1000ML 0.9% NACL PLASTIC POUR BTL ISTNC N-PYRG

## (undated) DEVICE — OIL EMULSION DRESSING 3X8 CURAD

## (undated) DEVICE — CONSTRUCT HIP TOTAL ENHANCED - MEDACTA USA

## (undated) DEVICE — DRAPE 2 INCS FILM POUCH ISL 129X100IN LG 27X12IN SURG STRDRP

## (undated) DEVICE — WATER STRL 1000ML PLASTIC POUR BTL LF

## (undated) DEVICE — ELECTRODE PT RTN C30- LB 9FT CORD NONIRRITATE NONSENSITIZE

## (undated) DEVICE — WRAP CMPR 5YDX3IN COBAN POR SLFADH ELASTIC LTWT HAND TEAR LF

## (undated) DEVICE — COVER STAND 57X30IN PRXM MAYO XL SMS STRL LF DISP

## (undated) DEVICE — SOLUTION SURG PRP 26ML 74% ISO ALC 0.7% IOD POVACRYLEX SELF

## (undated) DEVICE — NEEDLE SPNL 18GA 3.5IN PVC FREE DEHP-FR STRL LF SPNCN

## (undated) DEVICE — ELECTRODE ESURG BLADE PNCL 10FT 38IN TUBE ADPR PORT

## (undated) DEVICE — DRAPE ANTIMICROBIAL INCS 13X13IN SURG IOBN2 STRL

## (undated) DEVICE — GOWN SURG 2XL XLONG L4 RAGLAN SLV BRTHBL STRL LF DISP

## (undated) DEVICE — ELECTRODE ESURG BLADE 6.5IN EDGE LF

## (undated) DEVICE — GLOVE SURG 8 PROTEXIS LF BLUE PF SMTH BEAD CUFF INTLK STRL

## (undated) DEVICE — DRAPE SHT FNFLD 98X77IN XL SURG CNVRT STRL LF DISP PINK

## (undated) DEVICE — Device

## (undated) DEVICE — 2% CHLORHEXIDINE SKIN PREP ORANGE 26ML

## (undated) DEVICE — GLOVE SURG 7.5 PROTEXIS PI LF CRM PF BEAD CUFF STRL PLISPRN

## (undated) DEVICE — SPONGE LAPAROTOMY 18X18IN STERILE 5 PK

## (undated) DEVICE — STRIP 4X.5IN STRSTRP POLY REINFORCE SKNCLS WHT STRL LF

## (undated) DEVICE — SUTURE VICRYL 2-0 FS1 27IN BRAID COAT ABS UNDYED J443H

## (undated) DEVICE — HOOD SRG FLYTE SURGICOOL PEELAWAY STRL LF DISP

## (undated) DEVICE — GLOVE SURG 8 PROTEXIS PI LF CRM PF BEAD CUFF STRL PLISPRN

## (undated) DEVICE — DEVICE TRCLSN PLGCPRN 25 STRATAFIX SPRL MONOCRYL +

## (undated) DEVICE — DRESSING TRANS 12X8IN ADH HPOAL WTPRF TEGADERM PU STRL LF

## (undated) DEVICE — COVER TBL 72IN HALYARD BSC THK5.5 MIL HVDTY BACK PAD FNFLD 2

## (undated) DEVICE — DECANTER FLUID DSPNSR BAG BAJ DISP STRL LF ASEPTIC TRNSF

## (undated) DEVICE — DEVICE STRATAFIX PDS + 2 OS-8 SPRL 14IN SUT

## (undated) DEVICE — BLADE SAW 25MM THK1.19MM 90MM SAGITTAL ORIENT TEETH BVL LWR

## (undated) DEVICE — DRAPE U STRIP IMPRV ADH SPLIT 72X60IN 21X6IN SURG CNVRT STRL

## (undated) DEVICE — SUTURE PRMHND 0 30IN SILK BRAID TIES 6 STRN PCUT NABSB BLK A306H

## (undated) DEVICE — DRAPE ADH STRIP 52X48IN U 19.5X9.5IN SURG STRL LF DISP POLY

## (undated) DEVICE — PADDING CAST 4YDX4IN CTN SPCLST 100 DLTRL LOWPRFL HI

## (undated) DEVICE — SET INTPLS BN CLN TIP SCT TUBE HNDPC STRL LF DISP

## (undated) DEVICE — SPONGE GAUZE 4X4IN CTN 12 PLY WOVEN FOLD EDGE STRL LF

## (undated) DEVICE — PAD ABD 8X7.5IN CELLULOSE ABS NONWOVEN SEAL EDGE LF STRL

## (undated) DEVICE — TOWEL OR BLU 16X26IN 4PK